# Patient Record
Sex: FEMALE | Race: WHITE | URBAN - METROPOLITAN AREA
[De-identification: names, ages, dates, MRNs, and addresses within clinical notes are randomized per-mention and may not be internally consistent; named-entity substitution may affect disease eponyms.]

---

## 2021-11-04 ENCOUNTER — OFFICE VISIT (OUTPATIENT)
Dept: OBGYN | Age: 56
End: 2021-11-04
Payer: COMMERCIAL

## 2021-11-04 VITALS — WEIGHT: 119 LBS | HEART RATE: 77 BPM | DIASTOLIC BLOOD PRESSURE: 84 MMHG | SYSTOLIC BLOOD PRESSURE: 125 MMHG

## 2021-11-04 DIAGNOSIS — M85.80 OSTEOPENIA, UNSPECIFIED LOCATION: ICD-10-CM

## 2021-11-04 DIAGNOSIS — Z12.31 SCREENING MAMMOGRAM FOR BREAST CANCER: ICD-10-CM

## 2021-11-04 DIAGNOSIS — Z01.419 WOMEN'S ANNUAL ROUTINE GYNECOLOGICAL EXAMINATION: Primary | ICD-10-CM

## 2021-11-04 PROCEDURE — 99386 PREV VISIT NEW AGE 40-64: CPT | Performed by: OBSTETRICS & GYNECOLOGY

## 2021-11-04 PROCEDURE — 99203 OFFICE O/P NEW LOW 30 MIN: CPT | Performed by: OBSTETRICS & GYNECOLOGY

## 2021-11-04 RX ORDER — LATANOPROST 50 UG/ML
SOLUTION/ DROPS OPHTHALMIC
COMMUNITY
Start: 2021-07-31

## 2021-11-04 RX ORDER — UREA 10 %
LOTION (ML) TOPICAL
COMMUNITY
Start: 2021-09-05

## 2021-11-04 RX ORDER — ADALIMUMAB 40MG/0.4ML
KIT SUBCUTANEOUS
COMMUNITY
Start: 2021-10-12

## 2021-11-04 RX ORDER — CELECOXIB 200 MG/1
CAPSULE ORAL
COMMUNITY
Start: 2021-10-07

## 2021-11-04 RX ORDER — SULFASALAZINE 500 MG/1
TABLET ORAL
COMMUNITY
Start: 2021-10-18

## 2021-11-04 NOTE — PROGRESS NOTES
Patient is here today for annual exam. Patient has no issues today. A pap smear was obtained and will be sent to the lab.

## 2021-11-04 NOTE — PROGRESS NOTES
HISTORY OF PRESENT ILLNESS:    55 y.o. female   presents for her annual exam.     No LMP recorded. Patient is postmenopausal.  Periods are: n/a  Contraception: n/a  Number of new sexual partners: none,   Most recent pap smear: 2018 normal  History of abnormal pap smears: none  Most recent mammogram: 2018 normal  History of abnormal mammogram: none  Most recent colonoscopy: getting scheduled for it   Dexa scan: 2018 osteopenia  HPV vaccination: none  Changes to health since last visit: none  Complaints: None      Past Medical History:   Past Medical History:   Diagnosis Date    Glaucoma     Osteopenia     Rheumatoid arthritis ([facility] [address]                                              OB History    Para Term  AB Living   6 2 2   4     SAB TAB Ectopic Molar Multiple Live Births   2   2            # Outcome Date GA Lbr Rodney/2nd Weight Sex Delivery Anes PTL Lv   6 SAB            5 SAB            4 Ectopic            3 Ectopic            2 Term      Vag-Spont      1 Term      Vag-Spont             Past Surgical History:   Past Surgical History:   Procedure Laterality Date    DILATION AND CURETTAGE      SAB        Allergies: Patient has no known allergies. Medications:   Current Outpatient Medications   Medication Sig Dispense Refill    celecoxib (CELEBREX) 200 MG capsule TAKE ONE CAPSULE BY MOUTH TWO TIMES A DAY      latanoprost (XALATAN) 0.005 % ophthalmic solution INSTILL ONE DROP INTO EACH EYE EVERY DAY AT BEDTIME      sulfaSALAzine (AZULFIDINE) 500 MG tablet TAKE TWO TABLETS BY MOUTH DAILY      Zinc Sulfate 220 (50 Zn) MG TABS TAKE ONE TABLET BY MOUTH TWO TIMES A DAY      HUMIRA PEN 40 MG/0.4ML PNKT        No current facility-administered medications for this visit.          Social History:   Social History     Tobacco Use    Smoking status: Never Smoker    Smokeless tobacco: Never Used   Substance Use Topics    Alcohol use: Not Currently        Family History:   No family history on file. REVIEW OF SYSTEMS:    Constitutional: negative  HEENT: negative  Breast: negative  Respiratory: negative  Cardiovascular: negative  Gastrointestinal: negative  Genitourinary:negative  Integument: negative  Neurological: negative  Endocrine: negative          PHYSICAL EXAM  /84   Pulse 77   Wt 119 lb (54 kg)       General appearance: alert, cooperative and in no acute distress. Head: NCAT   Neck: Neck supple, no mass  Breasts: nontender, no masses palpable bilaterally, no dimpling, no discharge  Lungs: clear to auscultation bilaterally  Heart: regular rate and rhythm, no murmurs  Abdomen: soft, nontender, nondistended, no masses palpable, no rebound tenderness, no guarding or rigidity  Skin: No suspicious lesions  Neurologic: Alert and oriented, no focal deficits  Extremities: symmetrical without clubbing or cynosis  Psych: Alert, oriented, mood/affect full range, no acute distress    Pelvic Exam:   EXTERNAL GENITALIA: normal external genitalia, no external lesions  VAGINA: normal rugae, no discharge   CERVIX: normal appearing, no lesions, no bleeding  UTERUS: uterus is normal size, shape, consistency and nontender   ADNEXA: normal adnexa in size, nontender and no masses. RECTUM: no hemorrhoids or rectal masses. ASSESSMENT : Routine Annual Exam,    Diagnosis Orders   1. Women's annual routine gynecological examination  AMINATA DIGITAL SCREEN W OR WO CAD BILATERAL   2. Screening mammogram for breast cancer  PAP SMEAR   3. Osteopenia, unspecified location  DEXA BONE DENSITY AXIAL SKELETON        PLAN:    Return in about 1 year (around 11/4/2022) for Annual exam.      No orders of the defined types were placed in this encounter.       Orders Placed This Encounter   Procedures    AMINATA DIGITAL SCREEN W OR WO CAD BILATERAL     Further imaging can be completed per [address] protocol     Standing Status:   Future     Standing Expiration Date:   1/4/2023     Order Specific Question:   Reason for exam:     Answer:   screening mammogram    DEXA BONE DENSITY AXIAL SKELETON     Standing Status:   Future     Standing Expiration Date:   11/4/2022     Order Specific Question:   Reason for exam:     Answer:   osteopenia     Order Specific Question:   Where will the exam be performed? Answer:   [facility]    PAP SMEAR     Order Specific Question:   Collection Type     Answer: Thin Prep     Order Specific Question:   Prior Abnormal Pap Test     Answer:   No     Order Specific Question:   Screening or Diagnostic     Answer:   Screening     Order Specific Question:   Additional STD Testing     Answer:   N/A     Order Specific Question:   HPV Requested?      Answer:   HPV Co-Test     Order Specific Question:   High Risk Patient     Answer:   N/A         Electronically signed by Hans Navarro DO on 11/4/21

## 2021-11-12 LAB
HPV SAMPLE: NORMAL
HPV TYPE 16: NOT DETECTED
HPV TYPE 18: NOT DETECTED
HPV, HIGH RISK OTHER: NOT DETECTED
INTERPRETATION: NORMAL
SOURCE: NORMAL

## 2021-11-23 ENCOUNTER — TELEPHONE (OUTPATIENT)
Dept: OBGYN | Age: 56
End: 2021-11-23

## 2024-11-19 ENCOUNTER — OFFICE VISIT (OUTPATIENT)
Dept: ENT CLINIC | Age: 59
End: 2024-11-19
Payer: COMMERCIAL

## 2024-11-19 VITALS
OXYGEN SATURATION: 98 % | HEART RATE: 70 BPM | WEIGHT: 128 LBS | RESPIRATION RATE: 16 BRPM | SYSTOLIC BLOOD PRESSURE: 135 MMHG | BODY MASS INDEX: 21.33 KG/M2 | HEIGHT: 65 IN | TEMPERATURE: 97.6 F | DIASTOLIC BLOOD PRESSURE: 88 MMHG

## 2024-11-19 DIAGNOSIS — M06.9 RHEUMATOID ARTHRITIS, INVOLVING UNSPECIFIED SITE, UNSPECIFIED WHETHER RHEUMATOID FACTOR PRESENT (HCC): ICD-10-CM

## 2024-11-19 DIAGNOSIS — R09.81 NASAL CONGESTION: Primary | ICD-10-CM

## 2024-11-19 DIAGNOSIS — H04.129 DRY EYE: ICD-10-CM

## 2024-11-19 PROCEDURE — G8420 CALC BMI NORM PARAMETERS: HCPCS | Performed by: OTOLARYNGOLOGY

## 2024-11-19 PROCEDURE — 3017F COLORECTAL CA SCREEN DOC REV: CPT | Performed by: OTOLARYNGOLOGY

## 2024-11-19 PROCEDURE — 1036F TOBACCO NON-USER: CPT | Performed by: OTOLARYNGOLOGY

## 2024-11-19 PROCEDURE — G8484 FLU IMMUNIZE NO ADMIN: HCPCS | Performed by: OTOLARYNGOLOGY

## 2024-11-19 PROCEDURE — 99203 OFFICE O/P NEW LOW 30 MIN: CPT | Performed by: OTOLARYNGOLOGY

## 2024-11-19 PROCEDURE — G8427 DOCREV CUR MEDS BY ELIG CLIN: HCPCS | Performed by: OTOLARYNGOLOGY

## 2024-11-19 RX ORDER — AZELASTINE 1 MG/ML
2 SPRAY, METERED NASAL 2 TIMES DAILY
Qty: 60 ML | Refills: 5 | Status: SHIPPED | OUTPATIENT
Start: 2024-11-19

## 2024-11-19 ASSESSMENT — ENCOUNTER SYMPTOMS
GASTROINTESTINAL NEGATIVE: 1
COUGH: 0
VOICE CHANGE: 0
SORE THROAT: 0
COLOR CHANGE: 0
CHOKING: 0
SINUS PRESSURE: 0
TROUBLE SWALLOWING: 0
WHEEZING: 0
STRIDOR: 0
SINUS PAIN: 0
RHINORRHEA: 0

## 2024-11-19 ASSESSMENT — VISUAL ACUITY: OU: 1

## 2024-11-19 NOTE — PROGRESS NOTES
Subjective:      Patient ID:  Brooke Harding is a 59 y.o. female.    HPI:    Sinusitis  Patient presents with seasonal allergies especially during change of season. Itchy eyes, runny nose, nasal congestion. Bilateral sinus pressure causing headaches. Recently been constant in nature not seasonal. Uses flonase daily. Also feels like right eye has something in it.     Occasional OTC allergy medication.    Had JAYNA blood testing for allergies which was negative.    Hx of RA, glaucoma.    Patient's medications, allergies, past medical, surgical, social and family histories were reviewed and updated as appropriate.        Review of Systems   Constitutional:  Negative for activity change, fatigue and fever.   HENT:  Positive for congestion and postnasal drip. Negative for ear discharge, ear pain, hearing loss, nosebleeds, rhinorrhea, sinus pressure, sinus pain, sore throat, tinnitus, trouble swallowing and voice change.    Respiratory:  Negative for cough, choking, wheezing and stridor.    Cardiovascular:  Negative for chest pain.   Gastrointestinal: Negative.    Genitourinary: Negative.    Skin:  Negative for color change and rash.   Neurological:  Negative for speech difficulty, light-headedness, numbness and headaches.   Hematological:  Negative for adenopathy.   Psychiatric/Behavioral:  Negative for behavioral problems.                Objective:   Physical Exam  Constitutional:       Appearance: Normal appearance. She is normal weight.   HENT:      Head: Normocephalic and atraumatic.      Right Ear: Tympanic membrane, ear canal and external ear normal. No drainage.      Left Ear: Tympanic membrane, ear canal and external ear normal. No drainage.      Nose: Nose normal. No nasal deformity or septal deviation.      Mouth/Throat:      Mouth: Mucous membranes are moist.   Eyes:      General: Lids are normal. Vision grossly intact.      Extraocular Movements: Extraocular movements intact.      Conjunctiva/sclera:

## 2025-01-28 ENCOUNTER — OFFICE VISIT (OUTPATIENT)
Dept: ENT CLINIC | Age: 60
End: 2025-01-28
Payer: COMMERCIAL

## 2025-01-28 VITALS
OXYGEN SATURATION: 97 % | HEIGHT: 65 IN | DIASTOLIC BLOOD PRESSURE: 81 MMHG | SYSTOLIC BLOOD PRESSURE: 129 MMHG | WEIGHT: 124.6 LBS | HEART RATE: 85 BPM | BODY MASS INDEX: 20.76 KG/M2

## 2025-01-28 DIAGNOSIS — H04.129 DRY EYE: ICD-10-CM

## 2025-01-28 DIAGNOSIS — R09.81 NASAL CONGESTION: Primary | ICD-10-CM

## 2025-01-28 DIAGNOSIS — M06.9 RHEUMATOID ARTHRITIS, INVOLVING UNSPECIFIED SITE, UNSPECIFIED WHETHER RHEUMATOID FACTOR PRESENT (HCC): ICD-10-CM

## 2025-01-28 DIAGNOSIS — J30.1 SEASONAL ALLERGIC RHINITIS DUE TO POLLEN: ICD-10-CM

## 2025-01-28 PROCEDURE — G8427 DOCREV CUR MEDS BY ELIG CLIN: HCPCS | Performed by: OTOLARYNGOLOGY

## 2025-01-28 PROCEDURE — 1036F TOBACCO NON-USER: CPT | Performed by: OTOLARYNGOLOGY

## 2025-01-28 PROCEDURE — 99213 OFFICE O/P EST LOW 20 MIN: CPT | Performed by: OTOLARYNGOLOGY

## 2025-01-28 PROCEDURE — 3017F COLORECTAL CA SCREEN DOC REV: CPT | Performed by: OTOLARYNGOLOGY

## 2025-01-28 PROCEDURE — G8420 CALC BMI NORM PARAMETERS: HCPCS | Performed by: OTOLARYNGOLOGY

## 2025-01-28 ASSESSMENT — ENCOUNTER SYMPTOMS
TROUBLE SWALLOWING: 0
COUGH: 0
STRIDOR: 0
RHINORRHEA: 0
SINUS PRESSURE: 0
GASTROINTESTINAL NEGATIVE: 1
WHEEZING: 0
COLOR CHANGE: 0
CHOKING: 0
VOICE CHANGE: 0
SORE THROAT: 0
SINUS PAIN: 0

## 2025-01-28 ASSESSMENT — VISUAL ACUITY: OU: 1

## 2025-01-28 NOTE — PROGRESS NOTES
Appearance: Normal appearance. She is normal weight.   HENT:      Head: Normocephalic and atraumatic.      Right Ear: Tympanic membrane, ear canal and external ear normal. No drainage.      Left Ear: Tympanic membrane, ear canal and external ear normal. No drainage.      Nose: Nose normal. No nasal deformity or septal deviation.      Mouth/Throat:      Mouth: Mucous membranes are moist.   Eyes:      General: Lids are normal. Vision grossly intact.      Extraocular Movements: Extraocular movements intact.      Conjunctiva/sclera: Conjunctivae normal.      Pupils: Pupils are equal, round, and reactive to light.   Cardiovascular:      Rate and Rhythm: Normal rate.   Pulmonary:      Effort: Pulmonary effort is normal.   Musculoskeletal:         General: Normal range of motion.      Cervical back: Normal range of motion.   Lymphadenopathy:      Cervical: No cervical adenopathy.   Skin:     Capillary Refill: Capillary refill takes less than 2 seconds.   Neurological:      Mental Status: She is alert.   Psychiatric:         Mood and Affect: Mood normal.                 Assessment:       Diagnosis Orders   1. Nasal congestion        2. Rheumatoid arthritis, involving unspecified site, unspecified whether rheumatoid factor present ([facility])        3. Dry eye        4. Seasonal allergic rhinitis due to pollen                   Plan:      Allergic rhinitis  I do not want to continue fluticasone (Flonase) Astelin  for now.    I will also order a CT scan of the Sinuses due to the continued symptoms of the patient despite medical therapy.  Call or return to clinic prn if these symptoms worsen or fail to improve as anticipated.    I like to send the patient for allergy testing because they have shown to have more severe symptoms despite medical therapy.

## 2025-01-30 ENCOUNTER — TELEPHONE (OUTPATIENT)
Dept: ENT CLINIC | Age: 60
End: 2025-01-30

## 2025-01-30 NOTE — TELEPHONE ENCOUNTER
[facility] to authorize order with patient insurance. Patient is scheduled for CT Sinus with radiology on 3/3/25 @ 3:30pm. Patient has been notified of date and time and that they need to arrive at 3:00pm. Patient was informed she has no prep prior to procedure. Patient instructed to park in [facility] parking lot and report to registration. Patient verbalized understanding.    Electronically signed by Becca Ferguson MA on 1/30/25 at 8:33 AM EST

## 2025-03-03 ENCOUNTER — HOSPITAL ENCOUNTER (OUTPATIENT)
Dept: CT IMAGING | Age: 60
Discharge: HOME | End: 2025-03-05
Attending: OTOLARYNGOLOGY
Payer: COMMERCIAL

## 2025-03-03 DIAGNOSIS — R09.81 NASAL CONGESTION: ICD-10-CM

## 2025-03-03 PROCEDURE — 70486 CT MAXILLOFACIAL W/O DYE: CPT

## 2025-03-18 ENCOUNTER — OFFICE VISIT (OUTPATIENT)
Dept: ENT CLINIC | Age: 60
End: 2025-03-18
Payer: COMMERCIAL

## 2025-03-18 VITALS
OXYGEN SATURATION: 91 % | HEART RATE: 91 BPM | BODY MASS INDEX: 20.66 KG/M2 | SYSTOLIC BLOOD PRESSURE: 114 MMHG | WEIGHT: 124 LBS | RESPIRATION RATE: 12 BRPM | TEMPERATURE: 98.1 F | HEIGHT: 65 IN | DIASTOLIC BLOOD PRESSURE: 77 MMHG

## 2025-03-18 DIAGNOSIS — J30.1 SEASONAL ALLERGIC RHINITIS DUE TO POLLEN: ICD-10-CM

## 2025-03-18 DIAGNOSIS — H04.129 DRY EYE: ICD-10-CM

## 2025-03-18 DIAGNOSIS — R09.81 NASAL CONGESTION: ICD-10-CM

## 2025-03-18 DIAGNOSIS — J32.4 CHRONIC PANSINUSITIS: Primary | ICD-10-CM

## 2025-03-18 PROCEDURE — G8420 CALC BMI NORM PARAMETERS: HCPCS | Performed by: OTOLARYNGOLOGY

## 2025-03-18 PROCEDURE — 1036F TOBACCO NON-USER: CPT | Performed by: OTOLARYNGOLOGY

## 2025-03-18 PROCEDURE — 99214 OFFICE O/P EST MOD 30 MIN: CPT | Performed by: OTOLARYNGOLOGY

## 2025-03-18 PROCEDURE — G8427 DOCREV CUR MEDS BY ELIG CLIN: HCPCS | Performed by: OTOLARYNGOLOGY

## 2025-03-18 PROCEDURE — 3017F COLORECTAL CA SCREEN DOC REV: CPT | Performed by: OTOLARYNGOLOGY

## 2025-03-18 RX ORDER — PROPYLENE GLYCOL 0.6 G/100ML
1 LIQUID OPHTHALMIC 2 TIMES DAILY
COMMUNITY

## 2025-03-18 ASSESSMENT — ENCOUNTER SYMPTOMS
SINUS PAIN: 0
CHOKING: 0
COLOR CHANGE: 0
WHEEZING: 0
TROUBLE SWALLOWING: 0
SORE THROAT: 0
STRIDOR: 0
GASTROINTESTINAL NEGATIVE: 1
COUGH: 0
SINUS PRESSURE: 0
VOICE CHANGE: 0
RHINORRHEA: 0

## 2025-03-18 ASSESSMENT — VISUAL ACUITY: OU: 1

## 2025-03-18 NOTE — PROGRESS NOTES
Subjective:      Patient ID:  Brooke Harding is a 59 y.o. female.    HPI:    Pt returns for review of CT Sinus.  There are not changes since last visit.     Pt has been on astelin for 1 month(s) and is doing marginally      Past Medical History:   Diagnosis Date    Glaucoma     Osteopenia     Rheumatoid arthritis (HCC)      Past Surgical History:   Procedure Laterality Date    DILATION AND CURETTAGE      SAB    ECTOPIC PREGNANCY SURGERY  2001     Family History   Problem Relation Age of Onset    Hypertension Mother     Breast Cancer Mother 60    Diabetes Father     Hypertension Father     Other Father      Social History     Socioeconomic History    Marital status:      Spouse name: None    Number of children: None    Years of education: None    Highest education level: None   Tobacco Use    Smoking status: Never    Smokeless tobacco: Never   Vaping Use    Vaping status: Never Used   Substance and Sexual Activity    Alcohol use: Yes     Comment: social    Drug use: Never     No Known Allergies    Review of Systems   Constitutional:  Negative for activity change, fatigue and fever.   HENT:  Positive for congestion and postnasal drip. Negative for ear discharge, ear pain, hearing loss, nosebleeds, rhinorrhea, sinus pressure, sinus pain, sore throat, tinnitus, trouble swallowing and voice change.    Respiratory:  Negative for cough, choking, wheezing and stridor.    Cardiovascular:  Negative for chest pain.   Gastrointestinal: Negative.    Genitourinary: Negative.    Skin:  Negative for color change and rash.   Neurological:  Negative for speech difficulty, light-headedness, numbness and headaches.   Hematological:  Negative for adenopathy.   Psychiatric/Behavioral:  Negative for behavioral problems.    All other systems reviewed and are negative.            Objective:     Vitals:    03/18/25 1321   BP: 114/77   Pulse: 91   Resp: 12   Temp: 98.1 °F (36.7 °C)   SpO2: 91%     Physical Exam  Constitutional: